# Patient Record
Sex: FEMALE | Race: WHITE | HISPANIC OR LATINO | Employment: UNEMPLOYED | URBAN - METROPOLITAN AREA
[De-identification: names, ages, dates, MRNs, and addresses within clinical notes are randomized per-mention and may not be internally consistent; named-entity substitution may affect disease eponyms.]

---

## 2019-07-30 ENCOUNTER — TRANSCRIBE ORDERS (OUTPATIENT)
Dept: ADMINISTRATIVE | Facility: HOSPITAL | Age: 59
End: 2019-07-30

## 2019-07-30 DIAGNOSIS — R10.9 ACUTE ABDOMINAL PAIN: Primary | ICD-10-CM

## 2019-08-12 ENCOUNTER — HOSPITAL ENCOUNTER (OUTPATIENT)
Dept: NUCLEAR MEDICINE | Facility: HOSPITAL | Age: 59
Discharge: HOME | End: 2019-08-12
Payer: COMMERCIAL

## 2019-08-12 VITALS — WEIGHT: 133 LBS | BODY MASS INDEX: 26.86 KG/M2

## 2019-08-12 DIAGNOSIS — R10.9 ACUTE ABDOMINAL PAIN: ICD-10-CM

## 2019-08-12 PROCEDURE — 78227 HEPATOBIL SYST IMAGE W/DRUG: CPT

## 2019-08-12 PROCEDURE — A9537 TC99M MEBROFENIN: HCPCS

## 2019-08-12 RX ADMIN — SINCALIDE 1.2 MCG: 5 INJECTION, POWDER, LYOPHILIZED, FOR SOLUTION INTRAVENOUS at 14:17

## 2019-12-09 NOTE — PROGRESS NOTES
Assessment/Plan:      Diagnoses and all orders for this visit:    Sacroiliac joint pain  -     predniSONE 10 mg tablet; Take 6 tablets by mouth on day 1, 5 tablets day 2, 4 tablets day 3, 3 tablets day 4, 2 tablets day 5, and 1 tablet on day 6   -     Ambulatory referral to Physical Therapy; Future    Trapezius muscle spasm  -     tiZANidine (ZANAFLEX) 2 mg tablet; Take 1 tablet (2 mg total) by mouth 2 (two) times a day  -     Ambulatory referral to Physical Therapy; Future    Chronic neck and back pain  -     Ambulatory referral to Physical Therapy; Future    Other orders  -     amLODIPine (NORVASC) 10 mg tablet; Take 10 mg by mouth daily  -     losartan (COZAAR) 100 MG tablet; Take 100 mg by mouth daily  -     metoprolol succinate (TOPROL-XL) 50 mg 24 hr tablet  -     aspirin 81 mg chewable tablet; Chew 81 mg daily  -     nitroglycerin (NITROSTAT) 0 4 mg SL tablet; place 1 tablet under the tongue if needed every 5 minutes for ramon   (REFER TO PRESCRIPTION NOTES)  -     omeprazole (PriLOSEC) 40 MG capsule; Take 40 mg by mouth daily  -     atorvastatin (LIPITOR) 80 mg tablet  -     ibuprofen (MOTRIN) 400 mg tablet; Take 400 mg by mouth every 6 (six) hours as needed  -     sulfamethoxazole-trimethoprim (BACTRIM DS) 800-160 mg per tablet; take 1 tablet by mouth twice a day for 5 days      discussion: This is a 69-year-old female presenting for chronic neck and back pain  At this time I will order physical therapy specifically for the neck and the low back  I discussed with the patient that at this point in time since I feel that there is a significant inflammatory component to their pain symptoms, that they would benefit from a titrating dose of oral steroids over the next 6 days  I advised the patient that while on the steroids, they should not take any other oral NSAIDs except for acetaminophen or Tylenol until they have completed the steroid taper    I also advised them that once they have completed the steroid taper, they are to give our office a follow-up phone call to let us know how they were doing and if there are any signs of improvement  The patient was agreeable and verbalized an understanding  Since the patient is having myofascial pain and/or spasms, I discussed with the patient that starting a muscle relaxer such as [tizanidine ], could help decrease some of the myofascial pain and or spasms  I advised the patient that they should not drive or operate machinery while on this medication until they see how it affects them, as it could cause lethargy and mental cloudiness  I advised the patient to call our office if they experience any side effects or issues with the medication changes  The patient verbalized an understanding  Patient was advised to follow up in this office in 6-8 weeks should pain not improve or worsen after completion of physical therapy  To consider further imaging at that time  She was additionally encouraged to follow-up with orthopedics regarding her elbow and left shoulder complaints  Subjective:     Patient ID: Art Nguyen is a 63 y o  female  HPI  Bulgarian-speaking female presents with daughter who provides translation  Self-referral for neck and low back pain since fall on 8/11/19 after falling on [employer] forward and landed on elbows with fracture of ribght elbow  Treated with [facility]opedics through [facility] as well as Orthopedics for left shoulder impingement  She has not had a recent orthopedic follow-up for these issues  Pain in back and neck slowly progressed after fall  [facility] and [facility] x 3 months but not specifically for neck or low back  Neck pain described as a constant sharp pain which radiates into the bilateral upper extremities to the elbows  She denies any numbness tingling  She does admit to some left upper extremity weakness  She denies any change in fine motor skills  She denies any headaches or visual changes    She denies any trouble swallowing  Neck pain is exacerbated with any activities  It does improve with lying supine  Previous x-rays do reveal mild multilevel degenerative changes but otherwise unremarkable  She denies any previous surgical or interventional treatments for her neck or low back pain  Low back pain primarily right-sided with intermittent radiation into right hip and thigh  This pain is brought on by patient sitting for prolonged periods of time with legs crossed  She states and she on crosses her legs she gets numbness and tingling and pain into the thigh  She denies any pain past the knee  She denies any numbness tingling or weakness in the lower extremities  She denies any bowel or bladder changes or saddle anesthesia  Patient states she is currently taking Tylenol for pain relief  She denies history of diabetes however she is a poor historian and unsure of current medications  PAST MEDICAL HISTORY  History reviewed  No pertinent past medical history  PAST SURGICAL HISTORY  History reviewed  No pertinent surgical history  FAMILY HISTORY  History reviewed  No pertinent family history  HOME MEDICATIONS  Current Outpatient Medications   Medication Sig Dispense Refill    amLODIPine (NORVASC) 10 mg tablet Take 10 mg by mouth daily  0    aspirin 81 mg chewable tablet Chew 81 mg daily      atorvastatin (LIPITOR) 80 mg tablet   0    ibuprofen (MOTRIN) 400 mg tablet Take 400 mg by mouth every 6 (six) hours as needed  0    losartan (COZAAR) 100 MG tablet Take 100 mg by mouth daily  0    metoprolol succinate (TOPROL-XL) 50 mg 24 hr tablet   0    nitroglycerin (NITROSTAT) 0 4 mg SL tablet place 1 tablet under the tongue if needed every 5 minutes for ramon   (REFER TO PRESCRIPTION NOTES)    0    omeprazole (PriLOSEC) 40 MG capsule Take 40 mg by mouth daily      sulfamethoxazole-trimethoprim (BACTRIM DS) 800-160 mg per tablet take 1 tablet by mouth twice a day for 5 days  0    predniSONE 10 mg tablet Take 6 tablets by mouth on day 1, 5 tablets day 2, 4 tablets day 3, 3 tablets day 4, 2 tablets day 5, and 1 tablet on day 6  21 tablet 0    tiZANidine (ZANAFLEX) 2 mg tablet Take 1 tablet (2 mg total) by mouth 2 (two) times a day 30 tablet 1     No current facility-administered medications for this visit  ALLERGIES  Contrast [iodinated diagnostic agents]      SOCIAL HISTORY  Social History     Substance and Sexual Activity   Alcohol Use Not on file     Social History     Substance and Sexual Activity   Drug Use Not on file     Social History     Tobacco Use   Smoking Status Not on file       Review of Systems   Constitutional: Positive for activity change  Negative for chills, diaphoresis, fatigue, fever and unexpected weight change  HENT: Negative for trouble swallowing  Eyes: Negative for visual disturbance  Respiratory: Negative for shortness of breath  Cardiovascular: Negative for chest pain and leg swelling  Gastrointestinal: Negative for constipation and diarrhea  Endocrine: Negative for cold intolerance and heat intolerance  Genitourinary: Negative for decreased urine volume and difficulty urinating  Musculoskeletal: Positive for arthralgias (left shoulder, right elbow), back pain, myalgias, neck pain and neck stiffness  Negative for gait problem and joint swelling  Skin: Negative for color change and rash  Allergic/Immunologic: Negative for immunocompromised state  Neurological: Positive for weakness and numbness  Negative for dizziness, syncope, light-headedness and headaches  Psychiatric/Behavioral: Negative for sleep disturbance  Objective:  Vitals:    12/10/19 1027   BP: 130/96   Pulse: 84       Imaging:  No images are attached to the encounter  Physical Exam   Constitutional: She is oriented to person, place, and time  She appears well-developed and well-nourished  No distress  HENT:   Head: Normocephalic and atraumatic     Eyes: Pupils are equal, round, and reactive to light  Conjunctivae are normal    Neck: Neck supple  Cardiovascular: Intact distal pulses  Pulmonary/Chest: Effort normal  No respiratory distress  Musculoskeletal:        Cervical back: She exhibits decreased range of motion and tenderness  She exhibits no bony tenderness  Lumbar back: She exhibits decreased range of motion and tenderness  She exhibits no bony tenderness  TTP bilateral traps and scalenes     TTP lumbar paraspinals and SIJs R>L    Mild ITB TTP     + DANIELA right    Neurological: She is alert and oriented to person, place, and time  She has normal strength  She displays no atrophy and normal reflexes  No cranial nerve deficit or sensory deficit  She exhibits normal muscle tone  Coordination and gait normal    Reflex Scores:       Tricep reflexes are 2+ on the right side and 2+ on the left side  Bicep reflexes are 2+ on the right side and 2+ on the left side  Brachioradialis reflexes are 2+ on the right side and 2+ on the left side  Patellar reflexes are 2+ on the right side and 2+ on the left side  Achilles reflexes are 2+ on the right side and 2+ on the left side  - Spurling's   - Merino's   - SLR    Able to heel stand, toe stand        Skin: Skin is warm and dry  Capillary refill takes less than 2 seconds  No rash noted  She is not diaphoretic  No erythema  No pallor  Psychiatric: She has a normal mood and affect  Her speech is normal and behavior is normal  Judgment and thought content normal  She is attentive  Vitals reviewed

## 2019-12-10 ENCOUNTER — OFFICE VISIT (OUTPATIENT)
Dept: PAIN MEDICINE | Facility: CLINIC | Age: 59
End: 2019-12-10
Payer: COMMERCIAL

## 2019-12-10 VITALS
HEIGHT: 62 IN | DIASTOLIC BLOOD PRESSURE: 96 MMHG | HEART RATE: 84 BPM | BODY MASS INDEX: 27.42 KG/M2 | WEIGHT: 149 LBS | SYSTOLIC BLOOD PRESSURE: 130 MMHG

## 2019-12-10 DIAGNOSIS — M54.2 CHRONIC NECK AND BACK PAIN: ICD-10-CM

## 2019-12-10 DIAGNOSIS — G89.29 CHRONIC NECK AND BACK PAIN: ICD-10-CM

## 2019-12-10 DIAGNOSIS — M53.3 SACROILIAC JOINT PAIN: Primary | ICD-10-CM

## 2019-12-10 DIAGNOSIS — M62.838 TRAPEZIUS MUSCLE SPASM: ICD-10-CM

## 2019-12-10 DIAGNOSIS — M54.9 CHRONIC NECK AND BACK PAIN: ICD-10-CM

## 2019-12-10 PROCEDURE — 99204 OFFICE O/P NEW MOD 45 MIN: CPT | Performed by: PHYSICIAN ASSISTANT

## 2019-12-10 RX ORDER — TIZANIDINE 2 MG/1
2 TABLET ORAL 2 TIMES DAILY
Qty: 30 TABLET | Refills: 1 | Status: SHIPPED | OUTPATIENT
Start: 2019-12-10

## 2019-12-10 RX ORDER — METOPROLOL SUCCINATE 50 MG/1
TABLET, EXTENDED RELEASE ORAL
Refills: 0 | COMMUNITY
Start: 2019-12-06

## 2019-12-10 RX ORDER — SULFAMETHOXAZOLE AND TRIMETHOPRIM 800; 160 MG/1; MG/1
TABLET ORAL
Refills: 0 | COMMUNITY
Start: 2019-12-04

## 2019-12-10 RX ORDER — ASPIRIN 81 MG/1
81 TABLET, CHEWABLE ORAL DAILY
COMMUNITY
Start: 2019-09-27 | End: 2020-09-26

## 2019-12-10 RX ORDER — ATORVASTATIN CALCIUM 80 MG/1
TABLET, FILM COATED ORAL
Refills: 0 | COMMUNITY
Start: 2019-11-19

## 2019-12-10 RX ORDER — NITROGLYCERIN 0.4 MG/1
TABLET SUBLINGUAL
Refills: 0 | COMMUNITY
Start: 2019-09-10

## 2019-12-10 RX ORDER — AMLODIPINE BESYLATE 10 MG/1
10 TABLET ORAL DAILY
Refills: 0 | COMMUNITY
Start: 2019-09-21

## 2019-12-10 RX ORDER — LOSARTAN POTASSIUM 100 MG/1
100 TABLET ORAL DAILY
Refills: 0 | COMMUNITY
Start: 2019-11-18

## 2019-12-10 RX ORDER — OMEPRAZOLE 40 MG/1
40 CAPSULE, DELAYED RELEASE ORAL DAILY
COMMUNITY
Start: 2015-11-10

## 2019-12-10 RX ORDER — IBUPROFEN 400 MG/1
400 TABLET ORAL EVERY 6 HOURS PRN
Refills: 0 | COMMUNITY
Start: 2019-10-01

## 2019-12-10 RX ORDER — PREDNISONE 10 MG/1
TABLET ORAL
Qty: 21 TABLET | Refills: 0 | Status: SHIPPED | OUTPATIENT
Start: 2019-12-10

## 2019-12-10 NOTE — PATIENT INSTRUCTIONS
Prednisone (Por la boca)   Se [place] para tratar varias enfermedades y condiciones, en particular problemas relacionados con la inflamación  Cierra medicamento es un corticosteroide  Cole(s) : Contrast Allergy PreMed Pack, Rochelle, predniSONE Intensol   Existen muchas otras marcas de Raghavendra  Cierra medicamento no debe ser usado cuando:   Cierra medicamento no es adecuado para todas las personas  No lo use si ha tenido abrahan reacción alérgica a la prednisona o si usted está embarazada  Forma de usar cierra medicamento:   Líquido, Tableta, Tableta de liberación programada  · Egan lu medicamentos loren se le haya indicado  Es probable que sea necesario cambiar michelle dosis varias veces hasta encontrar la que funciona mejor para usted  · Lo más conveniente es kb cierra medicamento con comida o con [place]  · Pase (trague) entera la tableta de acción retardada  No triture, rompa o mastique  · Mida el líquido oral con Homar [place], [place] para uso oral o taza especialmente marcadas para medir medicamentos  · Si olvida abrahan dosis: Si olvida abrahan dosis de michelle medicamento, tómelo lo más pronto posible  Si es hermes la hora para michelle próxima dosis, espere hasta entonces para kb michelle dosis regular  No use medicamento adicional para reponer la dosis olvidada  · Guarde el medicamento en un recipiente cerrado a temperatura ambiente y alejado del calor, la humedad y la arsen directa  No congele la solución oral   Medicamentos y alimentos que debe evitar:   Consulte con michelle médico o farmacéutico antes de usar cualquier medicamento, incluyendo los que compra sin receta médica, las vitaminas y los productos herbales    · Informe a michelle médico si usted [place] alguno de los siguientes medicamentos:  ¨ Aminoglutetimida, anfotericina B, carbamazepina, colestiramina, ciclosporina, digoxina, isoniazida, ketoconazol, fenobarbital, fenitoína o rifampicina  ¨ Un anticoagulante, loren la warfarina  ¨ Un medicamento antiinflamatorio no esteroide (NOAH) para la artritis o el dolor, loren aspirina, diclofenac, ibuprofeno, naproxeno, celecoxib  ¨ Diurético  ¨ Medicamento para la diabetes  ¨ Antibióticos macrólidos, loren azitromicina, claritromicina, eritromicina  ¨ Estrógeno incluyendo píldoras anticonceptivas o terapia de reemplazo hormonal  · Cierra medicamento podría interferir con las vacunas  Consulte a michelle médico antes de recibir [employer] contra la gripe u otras vacunas  Precauciones marjan el uso de cierra medicamento:   · No es seguro kb cierra medicamento marjan el [facility]  Podría dañar al feto  Dígale a michelle médico de inmediato si usted Hildy Fass  · Informe a michelle médico si usted está dando de lactar o si usted sufre de problemas renales, insuficiencia cardíaca, hipertensión, un infarto al corazón, diabetes, glaucoma, osteoporosis, o problemas de tiroides  Informe a michelle médico acerca de cualquier infección que usted tenga  También informe a michelle médico si usted ha tenido [place] o emocionales (loren la depresión) o problemas estomacales o intestinales (loren Leldon Fish o diverticulitis)  · Cierra medicamento puede causar los siguientes problemas:  ¨ Cambios de [place] o del estado de ánimo  ¨ Mayor presión arterial, retención de agua, cambios en los niveles de sal o de potasio en michelle cuerpo  ¨ Cataratas o glaucoma (con uso a travis plazo)  ¨ Debilidad en los [employer] u osteoporosis (con el uso a travis plazo)  ¨ Crecimiento lento en niños (con el uso a travis plazo)  ¨ Problemas musculares (con dosis altas, especialmente si usted tiene miastenia gravis o problemas similares en los nerviosos y musculares)  · No suspenda el uso de cierra medicamento súbitamente  Michelle médico necesitará disminuir michelle dosis poco a poco antes de suspender el medicamento por completo  · Cierra medicamento podría causar que usted contraiga infecciones con mayor facilidad  Informe a michelle médico de inmediato si usted ha estado expuesto a la varicela, el sarampión, u otras infecciones graves  Informe a michelle médico si usted tuvo abrahan infección grave en el pasado, loren la tuberculosis o herpes  · Informe a michelle médico acerca de cualquier estrés adicional, o lo que le provoca ansiedad en michelle ayah  Podría ser necesario cambiar michelle dosis por un corto periodo de [place]  · Dígale a todo médico o dentista encargado de atenderle que usted está usando [employer]  Puede que kt medicamento afecte algunos resultados de [employer]  · Guarde todos los medicamentos fuera del alcance de los niños  Nunca comparta lu medicamentos con [employer]  Efectos secundarios que pueden presentarse marjan el uso de kt medicamento:   Consulte inmediatamente con el médico si nota cualquiera de estos efectos secundarios:  · Reacción alérgica: Comezón o ronchas, hinchazón del kirstin o las siobhan, hinchazón u hormigueo en la boca o garganta, opresión en el pecho, dificultad para respirar  · Pecas oscuras, cambio del color de la piel, frialdad, debilidad, cansancio, náusea, vómito, pérdida del peso corporal  · Depresión, pensamientos, sentimientos o comportamientos inusuales, dificultad para dormir  · Alverto, escalofríos, tos, dolor de garganta, o el cuerpo adolorido  · Dolor o debilidad en los músculos  · Aumento rápido de peso, inflamación en lu siobhan, tobillos, o pies  · Dolor intenso de [place], náuseas, vómitos o deposiciones de color patton o giuliana  · Cambios o crecimientos en la piel  · Problemas en la vista, dolor en los ojos, dolor de andi  Consulte con el médico si nota los siguientes efectos secundarios menos graves:   · Aumento del apetito  · [place] redonda, inflamada  · Aumento de peso alrededor de michelle carlos alberto, parte superior de la espalda, pecho, sunita o cintura  Consulte con el médico si nota otros efectos secundarios que phu son causados por kt medicamento  Llame a michelle médico para consultarle Barrie Waggoner puede notificar lu efectos secundarios al [facility] al [phone]    © 2017 [facility] Information is for End User's use only and may not be sold, redistributed or otherwise used for commercial purposes  Esta información es sólo para uso en educación  Michelle intención no es darle un consejo médico sobre enfermedades o tratamientos  Colsulte con michelle Neo Chill farmacéutico antes de seguir cualquier régimen médico para saber si es seguro y efectivo para usted  Tizanidine (Por la boca)   Se [place] para tratar la espasticidad muscular  Cole(s) : Comfort Pac w/tiZANidine, Zanaflex, Zanaflex Capsule   Existen muchas otras marcas de Raghavendra  Cierra medicamento no debe ser usado cuando:   Cierra medicamento no es adecuado para todas las personas  No lo use si usted ha tenido abrahan reacción alérgica a la tizanidina  Forma de usar cierra medicamento:   Genice Thuy  · [place] lu medicamentos lorne se le haya indicado  Es probable que sea necesario cambiar michelle dosis varias veces hasta encontrar la que funciona mejor para usted  · Cierra medicamento se puede kb con o sin alimentos, marsha siempre se debe de kb de la misma manera cada vez  La tizanidina funciona de manera distinta dependiendo si se jung con el estómago vacío o lleno  Consulte con el médico si usted tiene preguntas al [employer]  · Si olvida abrahan dosis: Si olvida abrahan dosis de michelle medicamento, tómelo lo más pronto posible  Si es hermes la hora para michelle próxima dosis, espere hasta entonces para kb michelle dosis regular  No use medicamento adicional para reponer la dosis olvidada  · Guarde el medicamento en un recipiente cerrado a temperatura ambiente y alejado del calor, la humedad y la arsen directa  Medicamentos y [employer] que debe evitar:   Consulte con michelle médico o farmacéutico antes de usar cualquier medicamento, incluyendo los que compra sin receta médica, las vitaminas y los productos herbales  · No use cierra medicamento con ciprofloxacino o fluvoxamina    · Algunos medicamentos y alimentos pueden afectar la eficacia de tizanidina  Informe a michelle médico si está usando cualquiera de los siguientes:  ¨ Aciclovir, baclofeno, cimetidina, famotidina, ticlopidina, verapamilo, zileutón  ¨ Píldoras anticonceptivas, medicamentos para la presión arterial, medicamentos para los problemas con el ritmo cardíaco (loren amiodarona, [place], propafenona), o medicamentos para tratar abrahna infección (loren levofloxacina, moxifloxacina)  · No consuma alcohol mientras esté usando kt medicamento  · Informe a michelle médico si usted [place] cualquier cosa que le provoca sueño  [employer] son medicamentos para alergia o medicamentos narcóticos para el dolor y el alcohol  Precauciones marjan el uso de kt medicamento:   · Infórmele al médico si usted está embarazada o dando de lactar, o si tiene enfermedad renal o hepática  · Kt medicamento puede provocarle los siguientes problemas:  ¨ Presión arterial baja  ¨ Daño hepático  · Kt medicamento podría causarle a usted mareos o somnolencia  No maneje un vehículo ni johanna ninguna tarea que pueda ser peligrosa hasta que usted sepa cómo lo afecta kt medicamento  Levántese o siéntese despacio si está mareado  · No suspenda el uso de kt medicamento súbitamente  Michelle médico necesitará disminuir michelle dosis poco a poco antes de suspender el medicamento por completo  · El médico solicitará exámenes de laboratorio marjan las citas de rutina para revisar los efectos de Raghavendra  Asista a todas lu citas  · Guarde todos los medicamentos fuera del alcance de los niños  Nunca comparta lu medicamentos con [employer]    Efectos secundarios que pueden presentarse marjan el uso de kt medicamento:   Consulte inmediatamente con el médico si nota cualquiera de estos efectos secundarios:  · Reacción alérgica: Comezón o ronchas, hinchazón del kirstin o las siobhan, hinchazón u hormigueo en la boca o garganta, opresión en el pecho, dificultad para respirar  · [employer] o heces pálidas, náuseas, vómitos, falta de apetito, dolor estomacal, coloración amarillenta en la piel u ojos  · Desvanecimientos, mareos o desmayos  · Pardeep o escuchar cosas que no existen  · Ritmo cardíaco lento  Consulte con el médico si nota los siguientes efectos secundarios menos graves:   · La boca seca  · Sueño o somnolencia  · Debilidad  Consulte con el médico si nota otros efectos secundarios que phu son causados por kt medicamento  Llame a ibarra médico para consultarle Barrie Waggoner puede notificar lu efectos secundarios al FDA al [phone]  © 2017 2600 Kiran [employer]wan Information is for End User's use only and may not be sold, redistributed or otherwise used for commercial purposes  Esta información es sólo para uso en educación  Ibarra intención no es darle un consejo médico sobre enfermedades o tratamientos  Colsulte con ibarra Pippa Mitesh farmacéutico antes de seguir cualquier régimen médico para saber si es seguro y efectivo para usted  Referral to physical therapy for neck and low back  Take prescribed medications as ordered  Follow up in office in 6 weeks if pain does not improve or worsens

## 2019-12-12 ENCOUNTER — OFFICE VISIT (OUTPATIENT)
Dept: OBGYN CLINIC | Facility: OTHER | Age: 59
End: 2019-12-12
Payer: COMMERCIAL

## 2019-12-12 VITALS
BODY MASS INDEX: 27.23 KG/M2 | HEIGHT: 62 IN | DIASTOLIC BLOOD PRESSURE: 77 MMHG | HEART RATE: 71 BPM | SYSTOLIC BLOOD PRESSURE: 146 MMHG | WEIGHT: 148 LBS

## 2019-12-12 DIAGNOSIS — M24.812 INTERNAL DERANGEMENT OF LEFT SHOULDER: Primary | ICD-10-CM

## 2019-12-12 PROCEDURE — 99243 OFF/OP CNSLTJ NEW/EST LOW 30: CPT | Performed by: ORTHOPAEDIC SURGERY

## 2019-12-12 NOTE — PROGRESS NOTES
Assessment  Diagnoses and all orders for this visit:    Internal derangement of left shoulder        Discussion and Plan:    · Explained to the patient that as she has tried and failed formal physical therapy w/ HEP and the use of anti-inflammatory medication, the next step in the treatment process is to perform an MRI to evaluate her rotator cuff for possible tearing  She was in agreement with this plan and wished to proceed  She is to continue with HEP as tolerated  · Follow up after MRI for discussion of results and further treatment options based on these results  Possible cortisone injection if no internal derangement is found  · Please note the patient is primarily Welsh speaking but her friend/family member was present and provided interpretation during the visit    Subjective:   Patient ID: Selin Carter is a 63 y o  female      The patient presents with a chief complaint of left shoulder pain  The pain began a few month(s) ago and is associated with an acute injury  Patient states that she suffered a mechanical fall on an outstretched left upper extremity  The patient describes the pain as aching and dull in intensity,  intermittent, occurring with increasing frequency in timing, and localizes the pain to the  left subacromial joint, deltoid  The pain is worse with movement, overhead work and raising arm over head and relieved by rest, ice, avoiding the painful activities  The pain is not associated with numbness and tingling  The pain is not associated with constitutional symptoms  The patient is awoken at night by the pain  The patient has had prior treatment in the form of formal physical therapy and the use of diclofenac gel prescribed by Dr Alejandro Mojica at [facility] without benefit of her symptoms                 The following portions of the patient's history were reviewed and updated as appropriate: allergies, current medications, past family history, past medical history, past social history, past surgical history and problem list     Review of Systems   Constitutional: Negative for chills, fever and unexpected weight change  HENT: Negative for hearing loss, nosebleeds and sore throat  Eyes: Negative for pain, redness and visual disturbance  Respiratory: Negative for cough, shortness of breath and wheezing  Cardiovascular: Negative for chest pain, palpitations and leg swelling  Gastrointestinal: Negative for abdominal distention, nausea and vomiting  Endocrine: Negative for polydipsia and polyuria  Genitourinary: Negative for dysuria and hematuria  Skin: Negative for rash and wound  Neurological: Negative for dizziness, numbness and headaches  Psychiatric/Behavioral: Negative for decreased concentration and suicidal ideas  Objective:  /77   Pulse 71   Ht 5' 2" (1 575 m)   Wt 67 1 kg (148 lb)   BMI 27 07 kg/m²       Left Shoulder Exam     Tenderness   Left shoulder tenderness location: Diffusely about the shoulder  Range of Motion   External rotation: 70   Forward flexion: 170   Internal rotation 0 degrees: Lumbar     Muscle Strength   Abduction: 4/5   External rotation: 4/5     Tests   Hampton test: positive  Drop arm: positive    Other   Erythema: absent  Sensation: normal  Pulse: present             Physical Exam   Constitutional: She is oriented to person, place, and time  She appears well-developed and well-nourished  Eyes: Pupils are equal, round, and reactive to light  Pulmonary/Chest: Effort normal and breath sounds normal    Neurological: She is alert and oriented to person, place, and time  Skin: Skin is warm and dry  Psychiatric: She has a normal mood and affect  Her behavior is normal  Judgment and thought content normal          I have personally reviewed pertinent films in PACS and my interpretation is as follows  X Ray Left Shoulder: Mild AC joint osteoarthritis   No other acute osseous abnormality    Scribe Attestation    I,:   Anton Hillman am acting as a scribe while in the presence of the attending physician :        I,:   Clifton Mauricio MD personally performed the services described in this documentation    as scribed in my presence :

## 2019-12-30 ENCOUNTER — HOSPITAL ENCOUNTER (OUTPATIENT)
Dept: RADIOLOGY | Age: 59
Discharge: HOME | End: 2019-12-30
Payer: COMMERCIAL

## 2019-12-30 DIAGNOSIS — M24.812 INTERNAL DERANGEMENT OF LEFT SHOULDER: ICD-10-CM

## 2019-12-30 PROCEDURE — 73221 MRI JOINT UPR EXTREM W/O DYE: CPT

## 2020-01-02 ENCOUNTER — OFFICE VISIT (OUTPATIENT)
Dept: OBGYN CLINIC | Facility: OTHER | Age: 60
End: 2020-01-02
Payer: COMMERCIAL

## 2020-01-02 VITALS
HEIGHT: 62 IN | DIASTOLIC BLOOD PRESSURE: 82 MMHG | SYSTOLIC BLOOD PRESSURE: 140 MMHG | HEART RATE: 82 BPM | WEIGHT: 151 LBS | BODY MASS INDEX: 27.79 KG/M2

## 2020-01-02 DIAGNOSIS — M75.42 SUBACROMIAL IMPINGEMENT OF LEFT SHOULDER: Primary | ICD-10-CM

## 2020-01-02 PROCEDURE — 99213 OFFICE O/P EST LOW 20 MIN: CPT | Performed by: ORTHOPAEDIC SURGERY

## 2020-01-02 NOTE — PROGRESS NOTES
Assessment  Diagnoses and all orders for this visit:    Subacromial impingement of left shoulder        Discussion and Plan:    · Explained to the patient that her MRI reveals no evidence of a rotator cuff tear, only mild supraspinatus tendinitis and moderate AC joint osteoarthritis  She was offered a cortisone injection into her subacromial bursa today in the office but she declined and wished to continue with formal PT/HEP as it has provided her with some benefit  · If symptoms persist after continuation of PT/HEP, patient was instructed to call office and schedule appointment to have a cortisone injection performed  She understood and all questions were answered  · A  was used throughout entire encounter  · Follow up on an as needed basis    Subjective:   Patient ID: Reji Brandon is a 63 y o  female      HPI  62 y/o female who presents today for a follow up visit for her left shoulder as well as to discuss MRI results  Patient continues to note diffuse pain about the anterolateral aspect of her shoulder which is made worse with overhead and repetitive motions  She states that she continues with HEP with "some" benefit  She has been using diclofenac gel prn for pain relief with minimal benefit  Denies numbness and tingling, fevers or chills  The following portions of the patient's history were reviewed and updated as appropriate: allergies, current medications, past family history, past medical history, past social history, past surgical history and problem list     Review of Systems   Constitutional: Negative for chills, fever and unexpected weight change  HENT: Negative for hearing loss, nosebleeds and sore throat  Eyes: Negative for pain, redness and visual disturbance  Respiratory: Negative for cough, shortness of breath and wheezing  Cardiovascular: Negative for chest pain, palpitations and leg swelling     Gastrointestinal: Negative for abdominal distention, nausea and vomiting  Endocrine: Negative for polydipsia and polyuria  Genitourinary: Negative for dysuria and hematuria  Skin: Negative for rash and wound  Neurological: Negative for dizziness, numbness and headaches  Psychiatric/Behavioral: Negative for decreased concentration and suicidal ideas  Objective:  /82   Pulse 82   Ht 5' 2" (1 575 m)   Wt 68 5 kg (151 lb)   BMI 27 62 kg/m²       Left Shoulder Exam     Range of Motion   External rotation: 70   Forward flexion: 170   Internal rotation 0 degrees: Lumbar     Muscle Strength   Abduction: 5/5   External rotation: 5/5     Tests   Hampton test: positive  Impingement: positive  Drop arm: negative    Other   Erythema: absent  Sensation: normal  Pulse: present             Physical Exam   Constitutional: She is oriented to person, place, and time  She appears well-developed and well-nourished  Eyes: Pupils are equal, round, and reactive to light  Pulmonary/Chest: Effort normal and breath sounds normal    Neurological: She is alert and oriented to person, place, and time  Skin: Skin is warm and dry  Psychiatric: She has a normal mood and affect  Her behavior is normal  Judgment and thought content normal          I have personally reviewed pertinent films in PACS and my interpretation is as follows  MRI Left Shoulder: Mild supraspinatus tendinosis without rotator cuff tear   Moderate AC joint osteoarthritis    Scribe Attestation    I,:   Kevin Jang am acting as a scribe while in the presence of the attending physician :        I,:   Yamileth Chaparro MD personally performed the services described in this documentation    as scribed in my presence :

## 2020-02-20 ENCOUNTER — OFFICE VISIT (OUTPATIENT)
Dept: OBGYN CLINIC | Facility: OTHER | Age: 60
End: 2020-02-20
Payer: COMMERCIAL

## 2020-02-20 VITALS
SYSTOLIC BLOOD PRESSURE: 131 MMHG | HEIGHT: 62 IN | BODY MASS INDEX: 27.23 KG/M2 | DIASTOLIC BLOOD PRESSURE: 82 MMHG | WEIGHT: 148 LBS | HEART RATE: 71 BPM

## 2020-02-20 DIAGNOSIS — M75.42 SUBACROMIAL IMPINGEMENT OF LEFT SHOULDER: Primary | ICD-10-CM

## 2020-02-20 PROCEDURE — 99213 OFFICE O/P EST LOW 20 MIN: CPT | Performed by: ORTHOPAEDIC SURGERY

## 2020-02-20 PROCEDURE — 20610 DRAIN/INJ JOINT/BURSA W/O US: CPT | Performed by: ORTHOPAEDIC SURGERY

## 2020-02-20 RX ORDER — BUPIVACAINE HYDROCHLORIDE 2.5 MG/ML
2 INJECTION, SOLUTION INFILTRATION; PERINEURAL
Status: COMPLETED | OUTPATIENT
Start: 2020-02-20 | End: 2020-02-20

## 2020-02-20 RX ORDER — BETAMETHASONE SODIUM PHOSPHATE AND BETAMETHASONE ACETATE 3; 3 MG/ML; MG/ML
6 INJECTION, SUSPENSION INTRA-ARTICULAR; INTRALESIONAL; INTRAMUSCULAR; SOFT TISSUE
Status: COMPLETED | OUTPATIENT
Start: 2020-02-20 | End: 2020-02-20

## 2020-02-20 RX ADMIN — BETAMETHASONE SODIUM PHOSPHATE AND BETAMETHASONE ACETATE 6 MG: 3; 3 INJECTION, SUSPENSION INTRA-ARTICULAR; INTRALESIONAL; INTRAMUSCULAR; SOFT TISSUE at 16:24

## 2020-02-20 RX ADMIN — BUPIVACAINE HYDROCHLORIDE 2 ML: 2.5 INJECTION, SOLUTION INFILTRATION; PERINEURAL at 16:24

## 2020-02-20 NOTE — PATIENT INSTRUCTIONS

## 2020-02-20 NOTE — PROGRESS NOTES
I personally examined the patient and reviewed the history provided  I agree with the note and the assessment and plan by Dr Anabel Rivera MD      Briefly the patient is a 63 y o  female who follows up with persistent left shoulder pain from her subacromial impingement syndrome  Please refer to the documented HPI in the body of the note for details  Physical Exam: Blood pressure 131/82, pulse 71, height 5' 2" (1 575 m), weight 67 1 kg (148 lb)  Left shoulder full range of motion with positive Hampton Neer impingement sign  5/5 abduction external rotation strength    Radiology: I have personally reviewed the following images and my read follows  MRI scan left shoulder shows no rotator cuff tear    Assessment:    Left subacromial impingement syndrome    Plan:    The patient is primarily Bangladeshi speaking so through the use of a  we were able to review the risks and benefits of a subacromial injection the patient did consent to have this performed today which was done as detailed in the procedure note  We will see her back on an as-needed basis as surgical intervention is not indicated for this process at this time        Assessment  Diagnoses and all orders for this visit:    Subacromial impingement of left shoulder        Discussion and Plan:    WBAT LUE  CSI provided to L shoulder  Tylenol as needed for further pain relief   Continue therapy exercises as directed   Will see patient back in 2 months for further evaluation     Subjective:   Patient ID: Matt Knapp is a 63 y o  female      Patient presents today for follow up regarding her left shoulder pain  Patient has been participating in physical therapy which she states has improved her overall motion of the shoulder but she continues to have persistent pain  History was obtained with the assistance of telephone              The following portions of the patient's history were reviewed and updated as appropriate: allergies, current medications, past family history, past medical history, past social history, past surgical history and problem list     Review of Systems   Constitutional: Negative for chills  HENT: Negative for hearing loss  Eyes: Negative for discharge  Respiratory: Negative for cough  Cardiovascular: Negative for chest pain  Skin: Negative for pallor  Neurological: Negative for numbness  Psychiatric/Behavioral: Negative for agitation  Objective:  /82   Pulse 71   Ht 5' 2" (1 575 m)   Wt 67 1 kg (148 lb)   BMI 27 07 kg/m²       Left Shoulder Exam     Range of Motion   Active abduction: normal   External rotation: normal   Forward flexion: normal   Internal rotation 0 degrees: Lumbar     Muscle Strength   Abduction: 5/5   Internal rotation: 5/5   External rotation: 5/5   Supraspinatus: 5/5   Subscapularis: 5/5   Biceps: 5/5     Tests   Hampton test: positive  Impingement: positive  Drop arm: negative    Other   Erythema: absent  Sensation: normal  Pulse: present             Physical Exam   Constitutional: She is oriented to person, place, and time  She appears well-developed and well-nourished  HENT:   Head: Normocephalic and atraumatic  Eyes: Pupils are equal, round, and reactive to light  EOM are normal    Cardiovascular: Normal rate and intact distal pulses  Pulmonary/Chest: Effort normal  No respiratory distress  Neurological: She is alert and oriented to person, place, and time  Skin: Skin is warm and dry  Psychiatric: She has a normal mood and affect   Her behavior is normal          Large joint arthrocentesis: L subacromial bursa  Date/Time: 2/20/2020 4:24 PM  Consent given by: patient  Timeout: Immediately prior to procedure a time out was called to verify the correct patient, procedure, equipment, support staff and site/side marked as required   Supporting Documentation  Indications: pain   Procedure Details  Location: shoulder - L subacromial bursa  Preparation: Patient was prepped and draped in the usual sterile fashion  Needle size: 22 G  Approach: lateral  Medications administered: 6 mg betamethasone acetate-betamethasone sodium phosphate 6 (3-3) mg/mL; 2 mL bupivacaine 0 25 %    Patient tolerance: patient tolerated the procedure well with no immediate complications  Dressing:  Sterile dressing applied

## 2021-02-10 ENCOUNTER — TRANSCRIBE ORDERS (OUTPATIENT)
Dept: ADMINISTRATIVE | Facility: HOSPITAL | Age: 61
End: 2021-02-10

## 2021-02-10 DIAGNOSIS — M47.816 SPONDYLOSIS WITHOUT MYELOPATHY OR RADICULOPATHY, LUMBAR REGION: Primary | ICD-10-CM

## 2021-02-24 ENCOUNTER — HOSPITAL ENCOUNTER (OUTPATIENT)
Dept: RADIOLOGY | Facility: HOSPITAL | Age: 61
Discharge: HOME | End: 2021-02-24
Payer: COMMERCIAL

## 2021-02-24 DIAGNOSIS — M47.816 SPONDYLOSIS WITHOUT MYELOPATHY OR RADICULOPATHY, LUMBAR REGION: ICD-10-CM

## 2021-02-24 PROCEDURE — G1004 CDSM NDSC: HCPCS

## 2021-02-24 PROCEDURE — 72148 MRI LUMBAR SPINE W/O DYE: CPT

## 2023-03-03 ENCOUNTER — HOSPITAL ENCOUNTER (EMERGENCY)
Facility: HOSPITAL | Age: 63
Discharge: HOME | End: 2023-03-03
Attending: EMERGENCY MEDICINE

## 2023-03-03 VITALS
HEART RATE: 115 BPM | OXYGEN SATURATION: 96 % | DIASTOLIC BLOOD PRESSURE: 88 MMHG | RESPIRATION RATE: 18 BRPM | SYSTOLIC BLOOD PRESSURE: 194 MMHG | TEMPERATURE: 101 F

## 2023-03-03 DIAGNOSIS — J02.9 SORE THROAT: ICD-10-CM

## 2023-03-03 DIAGNOSIS — R05.9 COUGH: ICD-10-CM

## 2023-03-03 DIAGNOSIS — R68.89 FLU-LIKE SYMPTOMS: Primary | ICD-10-CM

## 2023-03-03 DIAGNOSIS — R50.9 FEVER: ICD-10-CM

## 2023-03-03 LAB
FLUAV RNA RESP QL NAA+PROBE: NEGATIVE
FLUBV RNA RESP QL NAA+PROBE: NEGATIVE
RSV RNA RESP QL NAA+PROBE: NEGATIVE
SARS-COV-2 RNA RESP QL NAA+PROBE: NEGATIVE

## 2023-03-03 RX ORDER — AMOXICILLIN 250 MG/1
500 CAPSULE ORAL ONCE
Status: DISCONTINUED | OUTPATIENT
Start: 2023-03-03 | End: 2023-03-03 | Stop reason: HOSPADM

## 2023-03-03 RX ORDER — ACETAMINOPHEN 325 MG/1
975 TABLET ORAL ONCE
Status: COMPLETED | OUTPATIENT
Start: 2023-03-03 | End: 2023-03-03

## 2023-03-03 RX ORDER — AMOXICILLIN 500 MG/1
500 CAPSULE ORAL 3 TIMES DAILY
Qty: 21 CAPSULE | Refills: 0 | Status: SHIPPED | OUTPATIENT
Start: 2023-03-03 | End: 2023-03-10

## 2023-03-03 RX ORDER — IBUPROFEN 600 MG/1
600 TABLET ORAL ONCE
Status: COMPLETED | OUTPATIENT
Start: 2023-03-03 | End: 2023-03-03

## 2023-03-03 RX ADMIN — IBUPROFEN 600 MG: 600 TABLET, FILM COATED ORAL at 18:12

## 2023-03-03 RX ADMIN — ACETAMINOPHEN 975 MG: 325 TABLET ORAL at 18:12

## 2023-03-03 NOTE — ED ATTENDING ATTESTATION
3/3/2023  I, Brenna Garcia MD, saw and evaluated the patient  I have discussed the patient with the resident/non-physician practitioner and agree with the resident's/non-physician practitioner's findings, Plan of Care, and MDM as documented in the resident's/non-physician practitioner's note, except where noted  All available labs and Radiology studies were reviewed  I was present for key portions of any procedure(s) performed by the resident/non-physician practitioner and I was immediately available to provide assistance  At this point I agree with the current assessment done in the Emergency Department  I have conducted an independent evaluation of this patient a history and physical is as follows:    ED Course         Critical Care Time  Procedures    64 yo female with one day of fever, sore throat, body aches  Pt with grandson with strep dx  Pt with no n/v/d, no abdominal pain, no cp, no sob   pmh htn  Vss, febrile, tachy, lungs cta, rrr, abdomen soft nontender, phayrngeal erythema and exudate, lymphadenopathy  Tylenol, motrin, decadron, abx for strep, viral swab

## 2023-03-04 NOTE — ED PROVIDER NOTES
History  Chief Complaint   Patient presents with   • Flu Symptoms     Exposed to strep, sore throat, fever, congestion, fatigue since yesterday     HPI  Patient is a 59-year-old female presenting with sore throat, cough, fever, congestion, and fatigue  Patient states that the symptoms started since yesterday  Patient's granddaughter was positive for strep pharyngitis  Patient states that the cough is nonproductive  Patient states that whenever she swallows that she does feel soreness in her throat  Otherwise denies vomiting, chest pain, abdominal pain, diarrhea  Patient is not vaccinated for COVID and also not vaccinated for flu  Patient has a history of hypertension and is also an insulin-dependent diabetic  Patient denies any other complaints      Prior to Admission Medications   Prescriptions Last Dose Informant Patient Reported? Taking? amLODIPine (NORVASC) 10 mg tablet   Yes No   Sig: Take 10 mg by mouth daily   aspirin 81 mg chewable tablet   Yes No   Sig: Chew 81 mg daily   atorvastatin (LIPITOR) 80 mg tablet   Yes No   ibuprofen (MOTRIN) 400 mg tablet   Yes No   Sig: Take 400 mg by mouth every 6 (six) hours as needed   losartan (COZAAR) 100 MG tablet   Yes No   Sig: Take 100 mg by mouth daily   metoprolol succinate (TOPROL-XL) 50 mg 24 hr tablet   Yes No   nitroglycerin (NITROSTAT) 0 4 mg SL tablet   Yes No   Sig: place 1 tablet under the tongue if needed every 5 minutes for ramon   (REFER TO PRESCRIPTION NOTES)     omeprazole (PriLOSEC) 40 MG capsule   Yes No   Sig: Take 40 mg by mouth daily   predniSONE 10 mg tablet   No No   Sig: Take 6 tablets by mouth on day 1, 5 tablets day 2, 4 tablets day 3, 3 tablets day 4, 2 tablets day 5, and 1 tablet on day 6    sulfamethoxazole-trimethoprim (BACTRIM DS) 800-160 mg per tablet   Yes No   Sig: take 1 tablet by mouth twice a day for 5 days   tiZANidine (ZANAFLEX) 2 mg tablet   No No   Sig: Take 1 tablet (2 mg total) by mouth 2 (two) times a day Facility-Administered Medications: None       History reviewed  No pertinent past medical history  History reviewed  No pertinent surgical history  History reviewed  No pertinent family history  I have reviewed and agree with the history as documented  E-Cigarette/Vaping     E-Cigarette/Vaping Substances     Social History     Tobacco Use   • Smoking status: Never   • Smokeless tobacco: Never   Substance Use Topics   • Alcohol use: Not Currently   • Drug use: Not Currently        Review of Systems   Constitutional: Negative for chills, diaphoresis, fever and unexpected weight change  HENT: Positive for congestion and sore throat  Negative for ear pain  Eyes: Negative for visual disturbance  Respiratory: Positive for cough  Negative for chest tightness and shortness of breath  Cardiovascular: Negative for chest pain and leg swelling  Gastrointestinal: Negative for abdominal distention, abdominal pain, constipation, diarrhea, nausea and vomiting  Endocrine: Negative  Genitourinary: Negative for difficulty urinating and dysuria  Musculoskeletal: Negative  Skin: Negative  Allergic/Immunologic: Negative  Neurological: Negative  Hematological: Negative  Psychiatric/Behavioral: Negative  All other systems reviewed and are negative  Physical Exam  ED Triage Vitals [03/03/23 1711]   Temperature Pulse Respirations Blood Pressure SpO2   (!) 101 °F (38 3 °C) (!) 115 18 (!) 194/88 96 %      Temp Source Heart Rate Source Patient Position - Orthostatic VS BP Location FiO2 (%)   Tympanic Monitor -- -- --      Pain Score       --             Orthostatic Vital Signs  Vitals:    03/03/23 1711   BP: (!) 194/88   Pulse: (!) 115       Physical Exam  Vitals and nursing note reviewed  Constitutional:       General: She is not in acute distress  Appearance: Normal appearance  She is not ill-appearing  HENT:      Head: Normocephalic and atraumatic        Right Ear: External ear normal       Left Ear: External ear normal       Nose: Nose normal       Mouth/Throat:      Mouth: Mucous membranes are moist       Pharynx: Oropharyngeal exudate and posterior oropharyngeal erythema present  Comments: Swollen tonsils noted  Eyes:      General: No scleral icterus  Right eye: No discharge  Left eye: No discharge  Extraocular Movements: Extraocular movements intact  Conjunctiva/sclera: Conjunctivae normal       Pupils: Pupils are equal, round, and reactive to light  Cardiovascular:      Rate and Rhythm: Regular rhythm  Tachycardia present  Pulses: Normal pulses  Heart sounds: Normal heart sounds  Pulmonary:      Effort: Pulmonary effort is normal       Breath sounds: Normal breath sounds  Abdominal:      General: Abdomen is flat  Bowel sounds are normal  There is no distension  Palpations: Abdomen is soft  Tenderness: There is no abdominal tenderness  There is no guarding or rebound  Musculoskeletal:         General: Normal range of motion  Cervical back: Normal range of motion and neck supple  Skin:     General: Skin is warm and dry  Capillary Refill: Capillary refill takes less than 2 seconds  Neurological:      General: No focal deficit present  Mental Status: She is alert and oriented to person, place, and time  Mental status is at baseline  Psychiatric:         Mood and Affect: Mood normal          Behavior: Behavior normal          Thought Content:  Thought content normal          Judgment: Judgment normal          ED Medications  Medications   ibuprofen (MOTRIN) tablet 600 mg (600 mg Oral Given 3/3/23 1812)   acetaminophen (TYLENOL) tablet 975 mg (975 mg Oral Given 3/3/23 1812)       Diagnostic Studies  Results Reviewed     Procedure Component Value Units Date/Time    FLU/RSV/COVID - if FLU/RSV clinically relevant [[id number]]  (Normal) Collected: 03/03/23 1814    Lab Status: Final result Specimen: Nares from Nose Updated: 03/03/23 1902     SARS-CoV-2 Negative     INFLUENZA A PCR Negative     INFLUENZA B PCR Negative     RSV PCR Negative    Narrative:      FOR PEDIATRIC PATIENTS - copy/paste COVID Guidelines URL to browser: [email or web address]  [employer]    SARS-CoV-2 assay is a Nucleic Acid Amplification assay intended for the  qualitative detection of nucleic acid from SARS-CoV-2 in nasopharyngeal  swabs  Results are for the presumptive identification of SARS-CoV-2 RNA  Positive results are indicative of infection with SARS-CoV-2, the virus  causing COVID-19, but do not rule out bacterial infection or co-infection  with other viruses  Laboratories within the [place] and its  territories are required to report all positive results to the appropriate  public health authorities  Negative results do not preclude SARS-CoV-2  infection and should not be used as the sole basis for treatment or other  patient management decisions  Negative results must be combined with  clinical observations, patient history, and epidemiological information  This test has not been FDA cleared or approved  This test has been authorized by FDA under an Emergency Use Authorization  (EUA)  This test is only authorized for the duration of time the  declaration that circumstances exist justifying the authorization of the  emergency use of an in vitro diagnostic tests for detection of SARS-CoV-2  virus and/or diagnosis of COVID-19 infection under section 564(b)(1) of  the Act, 21 U  S C  763SMT-2(Z)(6), unless the authorization is terminated  or revoked sooner  The test has been validated but independent review by FDA  and CLIA is pending  Test performed using [employer] E2MS GeneXpert: This RT-PCR assay targets N2,  a region unique to SARS-CoV-2  A conserved region in the E-gene was chosen  for pan-Sarbecovirus detection which includes SARS-CoV-2      According to CMS-2020-01-R, this platform meets the definition of high-throughput technology  No orders to display         Procedures  Procedures      ED Course                             SBIRT 20yo+    Flowsheet Row Most Recent Value   SBIRT (25 yo +)    In order to provide better care to our patients, we are screening all of our patients for alcohol and drug use  Would it be okay to ask you these screening questions? No Filed at: 03/03/2023 8144                Medical Decision Making  52-year-old female presenting with fever, chills sore throat, cough, congestion and fatigue  On exam patient has bilateral swollen tonsils with exudates  We will plan to obtain viral swab as well as treat for possible strep pharyngitis  Patient however only received Tylenol Motrin and left the emergency department prior to receiving Decadron and first dose of amoxicillin  Called the patient's daughter stating that we will plan to prescribe amoxicillin for possible strep pharyngitis  Daughter understands and states that she will take her mother to fill the prescription and administer medications as prescribed      Risk  OTC drugs  Prescription drug management              Disposition  Final diagnoses:   Flu-like symptoms   Sore throat   Fever   Cough     Time reflects when diagnosis was documented in both MDM as applicable and the Disposition within this note     Time User Action Codes Description Comment    3/3/2023  7:38 PM Geralynn Lore Add [R68 89] Flu-like symptoms     3/3/2023  7:38 PM Geralynn Lore Add [J02 9] Sore throat     3/3/2023  7:39 PM Geralynn Lore Add [R50 9] Fever     3/3/2023  7:39 PM Geralynn Lore Add [R05 9] Cough       ED Disposition     ED Disposition   Left from Room after Provider Exam    Condition   --    Date/Time   Fri Mar 3, 2023  7:38 PM    Comment   --         Follow-up Information    None         Discharge Medication List as of 3/3/2023  8:10 PM      START taking these medications    Details   amoxicillin (AMOXIL) 500 mg capsule Take 1 capsule (500 mg total) by mouth 3 (three) times a day for 7 days, Starting Fri 3/3/2023, Until Fri 3/10/2023, Normal         CONTINUE these medications which have NOT CHANGED    Details   amLODIPine (NORVASC) 10 mg tablet Take 10 mg by mouth daily, Starting Sat 9/21/2019, Historical Med      aspirin 81 mg chewable tablet Chew 81 mg daily, Starting Fri 9/27/2019, Until Sat 9/26/2020, Historical Med      atorvastatin (LIPITOR) 80 mg tablet Starting Tue 11/19/2019, Historical Med      ibuprofen (MOTRIN) 400 mg tablet Take 400 mg by mouth every 6 (six) hours as needed, Starting Tue 10/1/2019, Historical Med      losartan (COZAAR) 100 MG tablet Take 100 mg by mouth daily, Starting Mon 11/18/2019, Historical Med      metoprolol succinate (TOPROL-XL) 50 mg 24 hr tablet Starting Fri 12/6/2019, Historical Med      nitroglycerin (NITROSTAT) 0 4 mg SL tablet place 1 tablet under the tongue if needed every 5 minutes for ramon   (REFER TO PRESCRIPTION NOTES)  , Historical Med      omeprazole (PriLOSEC) 40 MG capsule Take 40 mg by mouth daily, Starting Tue 11/10/2015, Historical Med      predniSONE 10 mg tablet Take 6 tablets by mouth on day 1, 5 tablets day 2, 4 tablets day 3, 3 tablets day 4, 2 tablets day 5, and 1 tablet on day 6 , Normal      sulfamethoxazole-trimethoprim (BACTRIM DS) 800-160 mg per tablet take 1 tablet by mouth twice a day for 5 days, Historical Med      tiZANidine (ZANAFLEX) 2 mg tablet Take 1 tablet (2 mg total) by mouth 2 (two) times a day, Starting Tue 12/10/2019, Normal           No discharge procedures on file  PDMP Review     None           ED Provider  Attending physically available and evaluated Prince Boudreaux  I managed the patient along with the ED Attending      Electronically Signed by         Mark Huizar MD  03/04/23 0900